# Patient Record
Sex: FEMALE | Race: ASIAN | Employment: FULL TIME | URBAN - METROPOLITAN AREA
[De-identification: names, ages, dates, MRNs, and addresses within clinical notes are randomized per-mention and may not be internally consistent; named-entity substitution may affect disease eponyms.]

---

## 2017-09-22 ENCOUNTER — HOSPITAL ENCOUNTER (EMERGENCY)
Age: 27
Discharge: OTHER INSTITUTION | End: 2017-09-22
Attending: OBSTETRICS & GYNECOLOGY | Admitting: OBSTETRICS & GYNECOLOGY
Payer: COMMERCIAL

## 2017-09-22 VITALS
BODY MASS INDEX: 40.12 KG/M2 | DIASTOLIC BLOOD PRESSURE: 96 MMHG | HEIGHT: 62 IN | WEIGHT: 218 LBS | TEMPERATURE: 98.1 F | SYSTOLIC BLOOD PRESSURE: 131 MMHG | HEART RATE: 94 BPM

## 2017-09-22 PROBLEM — Z33.1 IUP (INTRAUTERINE PREGNANCY), INCIDENTAL: Status: ACTIVE | Noted: 2017-09-22

## 2017-09-22 LAB
APPEARANCE UR: CLEAR
BILIRUB UR QL: NEGATIVE
COLOR UR: YELLOW
GLUCOSE UR QL STRIP.AUTO: NEGATIVE MG/DL
KETONES UR-MCNC: NEGATIVE MG/DL
LEUKOCYTE ESTERASE UR QL STRIP: NEGATIVE
NITRITE UR QL: NEGATIVE
PH UR: 7.5 [PH] (ref 5–9)
PROT UR QL: NEGATIVE MG/DL
RBC # UR STRIP: NEGATIVE /UL
SERVICE CMNT-IMP: NORMAL
SP GR UR: 1.02 (ref 1–1.02)
UROBILINOGEN UR QL: 0.2 EU/DL (ref 0.2–1)

## 2017-09-22 PROCEDURE — 59025 FETAL NON-STRESS TEST: CPT

## 2017-09-22 PROCEDURE — 87081 CULTURE SCREEN ONLY: CPT | Performed by: OBSTETRICS & GYNECOLOGY

## 2017-09-22 PROCEDURE — 96365 THER/PROPH/DIAG IV INF INIT: CPT

## 2017-09-22 PROCEDURE — 74011250636 HC RX REV CODE- 250/636: Performed by: OBSTETRICS & GYNECOLOGY

## 2017-09-22 PROCEDURE — 74011250637 HC RX REV CODE- 250/637: Performed by: OBSTETRICS & GYNECOLOGY

## 2017-09-22 PROCEDURE — 96372 THER/PROPH/DIAG INJ SC/IM: CPT

## 2017-09-22 PROCEDURE — 74011000258 HC RX REV CODE- 258: Performed by: OBSTETRICS & GYNECOLOGY

## 2017-09-22 PROCEDURE — 99283 EMERGENCY DEPT VISIT LOW MDM: CPT

## 2017-09-22 PROCEDURE — 81003 URINALYSIS AUTO W/O SCOPE: CPT

## 2017-09-22 PROCEDURE — 87077 CULTURE AEROBIC IDENTIFY: CPT | Performed by: OBSTETRICS & GYNECOLOGY

## 2017-09-22 PROCEDURE — 96360 HYDRATION IV INFUSION INIT: CPT

## 2017-09-22 RX ORDER — BETAMETHASONE SODIUM PHOSPHATE AND BETAMETHASONE ACETATE 3; 3 MG/ML; MG/ML
12 INJECTION, SUSPENSION INTRA-ARTICULAR; INTRALESIONAL; INTRAMUSCULAR; SOFT TISSUE
Status: COMPLETED | OUTPATIENT
Start: 2017-09-22 | End: 2017-09-22

## 2017-09-22 RX ORDER — AZITHROMYCIN 250 MG/1
1000 TABLET, FILM COATED ORAL
Status: COMPLETED | OUTPATIENT
Start: 2017-09-22 | End: 2017-09-22

## 2017-09-22 RX ORDER — SODIUM CHLORIDE, SODIUM LACTATE, POTASSIUM CHLORIDE, CALCIUM CHLORIDE 600; 310; 30; 20 MG/100ML; MG/100ML; MG/100ML; MG/100ML
125 INJECTION, SOLUTION INTRAVENOUS CONTINUOUS
Status: DISCONTINUED | OUTPATIENT
Start: 2017-09-22 | End: 2017-09-22 | Stop reason: HOSPADM

## 2017-09-22 RX ADMIN — BETAMETHASONE SODIUM PHOSPHATE AND BETAMETHASONE ACETATE 12 MG: 3; 3 INJECTION, SUSPENSION INTRA-ARTICULAR; INTRALESIONAL; INTRAMUSCULAR at 11:01

## 2017-09-22 RX ADMIN — AMPICILLIN SODIUM 2 G: 2 INJECTION, POWDER, FOR SOLUTION INTRAMUSCULAR; INTRAVENOUS at 11:01

## 2017-09-22 RX ADMIN — AZITHROMYCIN 1000 MG: 250 TABLET, FILM COATED ORAL at 11:01

## 2017-09-22 NOTE — PROGRESS NOTES
36  @ 33 weeks arrived from home with c/o LOF. To bathroom to void and change to gown, ambulated to bed and connected to EFM. 1000 Dr. Eliot Lopez paged. 12 Dr. Eliot Lopez at bedside assessing pt.    1138 Pt off unit with transport in stable condition.

## 2017-09-22 NOTE — H&P
History & Physical    Name: Sarai Rodríguez MRN: [id number]  SSN: [id number]    YOB: 1990  Age: 32 y.o. Sex: female      Subjective:     Reason for Admission:  Pregnancy and PPROM    History of Present Illness: Ms. Sylvain Askew is a 32 y.o.  female, [id number] with an estimated gestational age of 28w0d with Estimated Date of Delivery: 11/10/17. Patient complains of leaking fluid overnight after going to the bathroom; she was unsure if she had ruptured her membranes. Then she had a big gush of fluid after rolling over in her bed. She denies any contractions. She also denies any vaginal bleeding. She reports good fetal movement. This pregnancy has been complicated by placenta previa which has resolved. Patient was also being followed by UTD of the right kidney. She also had a normal fetal echo during this pregnancy. POBHx:  x 1 ; EAB x 1     OB History    Para Term  AB Living   2 1 1   1   SAB TAB Ectopic Molar Multiple Live Births        1      # Outcome Date GA Lbr Lukas/2nd Weight Sex Delivery Anes PTL Lv   2 Current            1 Term 11/10/03    Beltran Cartagena        Past Medical History:   Diagnosis Date    Abnormal Papanicolaou smear of cervix 2006    hx of leep     Past Surgical History:   Procedure Laterality Date    HX LEEP PROCEDURE  2006     Social History     Occupational History    Not on file. Social History Main Topics    Smoking status: Never Smoker    Smokeless tobacco: Never Used    Alcohol use No    Drug use: No    Sexual activity: Yes     Partners: Male      History reviewed. No pertinent family history. No Known Allergies  Prior to Admission medications    Medication Sig Start Date End Date Taking? Authorizing Provider   PRENATAL VIT CALC,IRON,FOLIC (PRENATAL #2 PO) Take 1 Tab by mouth daily.    Yes Historical Provider        Review of Systems:  A comprehensive review of systems was negative except for that written in the History of Present Illness. Objective:     Vitals:    Vitals:    17 0950   Weight: 98.9 kg (218 lb)   Height: 5' 2\" (1.575 m)      No data recorded. No Data Recorded     Physical Exam:  Patient without distress. Abdomen: soft, gravid, nontender  Cervical Exam: deferred  Lower Extremities: no calf tenderness  Membranes:  Premature Rupture of Membranes; Amniotic Fluid: clear fluid  Uterine Activity:  None  Fetal Heart Rate:  Reactive; 130's moderate variability, +accels, no decels    Lab/Data Review:  Recent Results (from the past 24 hour(s))   POC URINE MACROSCOPIC    Collection Time: 17  9:51 AM   Result Value Ref Range    Color YELLOW      Appearance CLEAR      Spec. gravity (POC) 1.020 1.001 - 1.023      pH, urine  (POC) 7.5 5.0 - 9.0      Protein (POC) NEGATIVE  NEG mg/dL    Glucose, urine (POC) NEGATIVE  NEG mg/dL    Ketones (POC) NEGATIVE  NEG mg/dL    Bilirubin (POC) NEGATIVE  NEG      Blood (POC) NEGATIVE  NEG      Urobilinogen (POC) 0.2 0.2 - 1.0 EU/dL    Nitrite (POC) NEGATIVE  NEG      Leukocyte esterase (POC) NEGATIVE  NEG      Performed by Irineo Navarro and Plan:      Active Problems:    IUP (intrauterine pregnancy), incidental (2017)       26yo [id number] @ 33w0d with  Premature Rupture of the Membranes  -start zithromax and ampicillin  -steroids for lung maturtiy  -culture obtained for GBS  -discussed case with MFJOSEFINA, Dr. Mccallum who accepts transfer to [facility]  -plan for neonatology consult when pt is at [facility]  -continuous monitor til transferred  -reactive tracing  -discussed plan of care with pt and her family and all questions answered and pt voiced understanding      Signed By:  Severa Hoist, DO     2017

## 2017-09-24 LAB
BACTERIA SPEC CULT: ABNORMAL
SERVICE CMNT-IMP: ABNORMAL

## 2019-08-12 ENCOUNTER — APPOINTMENT (OUTPATIENT)
Dept: PHYSICAL THERAPY | Age: 29
End: 2019-08-12
Payer: COMMERCIAL

## 2019-08-16 ENCOUNTER — HOSPITAL ENCOUNTER (OUTPATIENT)
Dept: PHYSICAL THERAPY | Age: 29
Discharge: HOME | End: 2019-08-16
Payer: COMMERCIAL

## 2019-08-16 PROCEDURE — 97140 MANUAL THERAPY 1/> REGIONS: CPT

## 2019-08-16 PROCEDURE — 97162 PT EVAL MOD COMPLEX 30 MIN: CPT

## 2019-08-16 PROCEDURE — 97110 THERAPEUTIC EXERCISES: CPT

## 2019-08-16 PROCEDURE — 97530 THERAPEUTIC ACTIVITIES: CPT

## 2019-08-16 NOTE — PROGRESS NOTES
[facility]  [address] [place], [address]  Ph [phone]  Fx [phone]    Plan of Care/ Statement of Necessity for Physical Therapy Services    Patient name: Mary Ware Start of Care: 2019   Referral source: Maria Del Rosario Parker NP : 1990    Medical Diagnosis: left SI strain/piriformis syndrome   Onset Date:2019 pregnancy    Treatment Diagnosis: Sacrococcygeal disorders, not elsewhere classified [M53.3]   Prior Hospitalization: see medical history Provider#: [id number]   Medications: Verified on Patient summary List    Comorbidities: previous SI dysfunction with 2nd pregnancy   Prior Level of Function: full function without pain      The Plan of Care and following information is based on the information from the initial evaluation. Assessment/ key information: 29 YOF 29 weeks pregnant is presenting with 3 months history of progressive left SI and left leg pain/paresthesia. Pain is ranging from 3-10/10 and constant in nature. Patient is reporting difficulty with all stooping, reaching left foot to put on sock/shoe and significant pain with any prolonged activity including sitting, standing, walking. Objective findings include TTP of pelvic symphysis/left SI/left piriformis ,Limited left SLR to 60 deg with pain  (alleviated with manual leg distraction),  limited functional mobility due to pain,postural deficits,  antalgia with gait and limited function as per self reported FOTO score. During her first visit patient was instructed in HEP, pain management techniques and an SI belt for pelvic stabilization. She is a good candidate for skilled PT and reported reduction in pain after the first visit.    Evaluation Complexity History MEDIUM  Complexity : 1-2 comorbidities / personal factors will impact the outcome/ POC ; Examination MEDIUM Complexity : 3 Standardized tests and measures addressing body structure, function, activity limitation and / or participation in recreation  ;Presentation MEDIUM Complexity : Evolving with changing characteristics  ; Clinical Decision Making MEDIUM Complexity : FOTO score of 26-74  Overall Complexity Rating: MEDIUM  Problem List: decrease ROM, decrease ADL/ functional abilitiies, decrease activity tolerance and decrease flexibility/ joint mobility   Treatment Plan may include any combination of the following: Therapeutic exercise, Therapeutic activities, Neuromuscular re-education, Physical agent/modality, Manual therapy and Patient education  Patient / Family readiness to learn indicated by: trying to perform skills and interest  Persons(s) to be included in education: patient (P)  Barriers to Learning/Limitations: None  Measures taken if barriers to learning: n/a  Patient Goal (s): control my pain  Patient Self Reported Health Status: good  Rehabilitation Potential: good    Short Term Goals: To be accomplished in 2 weeks:   1. Patient is compliant with HEP and daily use of SI belt for pelvic stability and pain control  Status at [facility]: patient education, patient was given SI belt    2. Reduction in left leg pain by >or= 50% for increased ease with ADL  Status at Eval: intermittent left leg pain /paresthesia     Long Term Goals: To be accomplished in 4 weeks:   1. Improved FOTO score to >or= 64/100 points as evidence of improved function  Status at [facility]: FOTO 36/100 indicating 'extreme difficulty performing usual work or household activities'    2. Reduction in pain to <or= 3/10 with ADL including standing and walking for increased ease with all function  Status at [facility]: pain 3-10/10    3. Improved left piriformis flexibility and increased left SLR to >or=80 deg to help with increased ease putting on shoes/socks on left side  Status at Eval: marked TTP and tightness left piriformis, SLR (+) for pain and limited to 60 deg likely due to muscular tightness      Frequency / Duration: Patient to be seen 1 times per week for 4 weeks.     Patient/ Caregiver education and instruction: Diagnosis, prognosis, activity modification, exercises and other daily use of SI belt   [x]  Plan of care has been reviewed with PTA    Certification Period: n/a  Sam Mcnulty, PT 8/16/2019 12:38 PM    ________________________________________________________________________    I certify that the above Therapy Services are being furnished while the patient is under my care. I agree with the treatment plan and certify that this therapy is necessary.     Physician's Signature:_____________________Date:____________TIME:________    [employer], Date and Time must be completed for valid certification **  Please sign and return to [facility] [address]  [facility], [address]  Ph [phone]  Fx [phone]

## 2019-08-16 NOTE — PROGRESS NOTES
PT DAILY TREATMENT NOTE/LUMBAR EVAL 10-18    Patient Name: Maurilio Kaye  Date:2019  : 1990  [x]  Patient  Verified  Payor: Lucinda Flies / Plan: [employer] HMO / Product Type: HMO /    In time:1115  Out time:1210  Total Treatment Time (min): 55  Visit #: 1 of 8    Medicare/BCBS Only   Total Timed Codes (min):  40 1:1 Treatment Time:  55     Treatment Area: Sacrococcygeal disorders, not elsewhere classified [M53.3]  SUBJECTIVE  Pain Level (0-10 scale): 6/10 left  []constant []intermittent []improving []worsening []no change since onset    Any medication changes, allergies to medications, adverse drug reactions, diagnosis change, or new procedure performed?: [x] No    [] Yes (see summary sheet for update)  Subjective functional status/changes:constant left buttock/sacral pain with intermittent paresthesia into left leg associated with pain. Pain ranging from 3-10/10 john over last 3 months. Pregnant 7 months, due date 10/23/19.   3rd pregnancy. Has had milder versions with last pregnancy. Onset in 2019. Pain is triggered by bending, squatting, standing, walking , sitting . Nocturnal pain is disturbing sleep. 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 Increased pain at end of work day -9/10. Has cut back and is doing Mo-Wed-Fr between 8-12 hours. Difficulty climbing stairs. Difficulty lifting left leg. Difficulty putting on shoes/socks.      PLOF: no back or leg pain  Limitations to PLOF: limited ADL, squatting, prolonged postures  Mechanism of Injury: progressive pregnancy  Current symptoms/Complaints: left SI/LB and left leg pain  Previous Treatment/Compliance: no previous PT  PMHx/Surgical Hx: n/a  Work Hx: as above  Living Situation: 2 level home, stairs  Pt Goals: decrease pain  Barriers: []pain []financial []time []transportation []other  Motivation: good  Substance use: []Alcohol []Tobacco []other:   FABQ Score: []low []elevate  Cognition: A & O x 3 Other:     OBJECTIVE/EXAMINATION  Domestic Life: WNL  Activity/Recreational Limitations: no formal ex program  Mobility: limited  Self Care: difficulty putting on socks/left shoe, independent        Modality rationale: decrease pain and increase tissue extensibility to improve the patients ability to return to PLOF   Min Type Additional Details    [] Estim:  []Unatt       []IFC  []Premod                        []Other:  []w/ice   []w/heat  Position:  Location:    [] Estim: []Att    []TENS instruct  []NMES                    []Other:  []w/US   []w/ice   []w/heat  Position:  Location:    []  Traction: [] Cervical       []Lumbar                       [] Prone          []Supine                       []Intermittent   []Continuous Lbs:  [] before manual  [] after manual    []  Ultrasound: []Continuous   [] Pulsed                           []1MHz   []3MHz Location:  W/cm2:    []  Iontophoresis with dexamethasone         Location: [] Take home patch   [] In clinic   10 []  Ice     [x]  heat  []  Ice massage  []  Laser   []  Anodyne Position:right SL with left leg on bolster  Location:left buttock region    []  Laser with stim  []  Other: Position:  Location:    []  Vasopneumatic Device Pressure:       [] lo [] med [] hi   Temperature: [] lo [] med [] hi   [] Skin assessment post-treatment:  []intact []redness- no adverse reaction    []redness  adverse reaction:     15 min [x]Eval                  []Re-Eval       15 min Therapeutic Exercise:  [x] See flow sheet :   Rationale: increase ROM and increase strength to improve the patients ability to perform ADL with more ease    10 min Therapeutic Activity:  [x]  See flow sheet :instruction in HEP, POC, use of SI belt for better pelvic stability/ 5 min of TA instruction given while patient resting with MH   Rationale: patient education and improved pelvic stability  to improve the patients ability to perform ADL          10 min Manual Therapy:  Gentle STM left piriformis, stretching, left leg manual distraction for pain control   Rationale: decrease pain to allow improved function with ADL              With   [] TE   [x] TA   [] neuro   [] other: Patient Education: [x] Review HEP    [] Progressed/Changed HEP based on:   [] positioning   [] body mechanics   [] transfers   [] heat/ice application    [] other:      Other Objective/Functional Measures: as per eval    Physical Therapy Evaluation - Lumbar Spine ([facility])    SUBJECTIVE  Chief Complaint:left buttock/SI and left leg pain    Mechanism of injury:pregnancy    Symptoms:  Aggravated by:   [x] Bending [x] Sitting [x] Standing [x] Walking   [x] Moving [] Cough [] Sneeze [] Valsalva   [] AM  [x] PM  Lying:  [] sup   [] pro   [] sidelying   [] Other:     Eased by:    [] Bending [] Sitting [] Standing [] Walking   [] Moving [] AM  [] PM  Lying: [] sup  [] pro  [] sidelying   [x] Other:rest     General Health:  Red Flags Indicated? [] Yes    [x] No  [] Yes [] No Recent weight change (If yes, due to dieting?  [] Yes  [] No)   [] Yes [] No Weakness in legs during walking  [] Yes [] No Unremitting pain at night  [] Yes [] No Abdominal pain or problems  [] Yes [] No Rectal bleeding  [] Yes [] No Feet more cold or painful in cold weather  [] Yes [] No Menstrual irregularities  [] Yes [] No Blood or pain with urination  [] Yes [] No Dysfunction of bowel or bladder  [] Yes [] No Recent illness within past 3 weeks (i.e, cold, flu)  [] Yes [] No Numbness/tingling in buttock/genitalia region    Past History/Treatments: n/a    Diagnostic Tests: [] Lab work [] X-rays    [] CT [] MRI     [] Other:  Results:    Functional Status  Prior level of function:full function, no pain  Present functional limitations:limited ADL tolerances due to pain and instability  What position do you sleep in?:supine, SL    OBJECTIVE  Posture:  Lateral Shift: [] R    [] L     [] +  [x] -  Kyphosis: [] Increased [] Decreased   [x]  WNL  Lordosis:  [x] Increased [] Decreased   [] WNL  Pelvic symmetry: [x] WNL    [] Other:    Gait:  [] Normal     [x] Abnormal:slowed gait with increased LS lordosis and mild antalgia due to left hip pain with WB    Active Movements: [] N/A   [] Too acute   [x] Other:limited in all planes due to left SI and leg pain  ROM % AROM % PROM Comments:pain, area   Forward flexion 40-60      Extension 20-30      SB right 20-30      SB left 20-30      Rotation right 5-10      Rotation left 5-10            Neuro Screen [] WNL  Myotome/Dermatome/Reflexes:  Comments:intermittent numbness left leg into foot    Dural Mobility:  SLR Sitting: [] R    [x] L    [x] +    [] -  @ (60 degrees): 60          Supine: [] R    [x] L    [x] +    [] -  @ (degrees):alleviation of pain with manual distraction at end range   Slump Test: [] R    [x] L    [x] +    [] -  @ (degrees):   Prone Knee Bend: [] R    [] L    [] +    [] -     Palpation  [] Min  [x] Mod  [] Severe    Location:left SI/sacral sulcus, PSIS and piriformis m, pelvic symphysis  [] Min  [] Mod  [] Severe    Location:  [] Min  [] Mod  [] Severe    Location:    Strength: functional 5/5 strength   L(0-5) R (0-5) N/T   Hip Flexion (L1,2)   []   Knee Extension (L3,4)   []   Ankle Dorsiflexion (L4)   []   Great Toe Extension (L5)   []   Ankle Plantarflexion (S1)   []   Knee Flexion (S1,2)   []   Upper Abdominals   []   Lower Abdominals   []   Paraspinals   []   Back Rotators   []   Gluteus Fady   []   Other   []     Special Tests  Lumbar:  Lumb.  Compression: [] Pos  [] Neg               Lumbar Distraction:   [] Pos  [] Neg    Quadrant:  [] Pos  [] Neg   [] Flex  [] Ext    Sacroilliac:  Gaenslen's: [] R    [] L    [] +    [] -     Compression: [] +    [] -     Gapping:  [] +    [] -     Thigh Thrust: [] R    [] L    [] +    [] -     Leg Length: [] +    [] -   Position:    Crests:    ASIS:    PSIS:+ left    Sacral Sulcus:+ left    Mobility: Standing flex:     Sitting flex:     Supine to sit:     Prone knee bend: Hip: Patience Hanahan:  [] R    [] L    [] +    [] -     Scour:  [] R    [] L    [] +    [] -     Piriformis: [] R    [x] L    [x] +    [] -          Deficits: Alvarado's: [] R    [] L    [] +    [] -     Yuko Public: [] R    [] L    [] +    [] -     Hamstrings 90/90:left 60, right 90    Gastrocsoleus (to neutral): Right: Left:       Global Muscular Weakness:  Abdominals:deficit due to advanced pregnancy  Quadratus Lumborum:  Paraspinals:tight  Other:    Other tests/comments:       Pain Level (0-10 scale) post treatment: 5/10    ASSESSMENT/Changes in Function: excellent response to MT, stretches and trial of SI belt. During session pain reduction to 4/10. Patient will continue to benefit from skilled PT services to address strength deficits and analyze and address soft tissue restrictions to attain remaining goals.      [x]  See Plan of Care  []  See progress note/recertification  []  See Discharge Summary         Progress towards goals / Updated goals:  Reduction in pain, good understanding of HEP and pain management techniques  Also good understanding of SI belt for improved pelvic stability and pain control    PLAN  []  Upgrade activities as tolerated     [x]  Continue plan of care  []  Update interventions per flow sheet       []  Discharge due to:_  []  Other:_      Deisi Butcher, PT 8/16/2019  11:19 AM

## 2019-08-20 ENCOUNTER — HOSPITAL ENCOUNTER (OUTPATIENT)
Dept: PHYSICAL THERAPY | Age: 29
Discharge: HOME | End: 2019-08-20
Payer: COMMERCIAL

## 2019-08-20 PROCEDURE — 97110 THERAPEUTIC EXERCISES: CPT

## 2019-08-20 PROCEDURE — 97140 MANUAL THERAPY 1/> REGIONS: CPT

## 2019-08-20 NOTE — PROGRESS NOTES
PT DAILY TREATMENT NOTE    Patient Name: Anmol Soliz  Date:2019  : 1990  [x]  Patient  Verified  Payor: Sridhar Richard / Plan: [employer] HMO / Product Type: HMO /    In time:352  Out time:443  Total Treatment Time (min): 51  Total Timed Codes (min): 41  1:1 Treatment Time (MC only): 41   Visit #: 2 of 8    Treatment Area: Sacrococcygeal disorders, not elsewhere classified [M53.3]    SUBJECTIVE  Pain Level (0-10 scale): 6/10  Any medication changes, allergies to medications, adverse drug reactions, diagnosis change, or new procedure performed?: [x] No    [] Yes (see summary sheet for update)  Subjective functional status/changes:   [] No changes reported  Pt reports that her pain vacillates up and down.     OBJECTIVE    Modalities Rationale:     decrease pain and increase tissue extensibility to improve patient's ability to stand more than 30 minutes   min [] Estim, type/location:                                      []  att     []  unatt     []  w/US     []  w/ice    []  w/heat    min []  Mechanical Traction: type/lbs                   []  pro   []  sup   []  int   []  cont    []  before manual    []  after manual    min []  Ultrasound, settings/location:      min []  Iontophoresis w/ dexamethasone, location:                                               []  take home patch       []  in clinic   10 min []  Ice     [x]  Heat    location/position: Legs up low back    min []  Vasopneumatic Device, press/temp:     min []  Other:    [x] Skin assessment post-treatment (if applicable):    [x]  intact    [x]  redness- no adverse reaction     []redness  adverse reaction:          18 min Therapeutic Exercise:  [] See flow sheet :   Rationale: increase ROM, increase strength, improve coordination and increase proprioception to improve the patients ability to stand more than 30 minutes      23 min Manual Therapy:  PROM bilateral hips, myofascial compression bilaterally into hips, myofascial leg pull on left to correct left posterior innominate rotation   Rationale: decrease pain, increase ROM, increase tissue extensibility, decrease edema  and decrease trigger points to improve the patients ability to stand more than 30 minutes    With   [x] TE   [] TA   [] neuro   [] other: Patient Education: [x] Review HEP    [] Progressed/Changed HEP based on:   [x] positioning   [x] body mechanics   [] transfers   [x] heat/ice application    [] other:      Other Objective/Functional Measures:      Pain Level (0-10 scale) post treatment: right 5/10 and left 3-4/10    ASSESSMENT/Changes in Function: Pt demonstrates left posterior innominate rotation that was corrected with manual work. She also demonstrates signficiant myofascial tension through bilateral hips right more than left despite symptomatic in left hip    Patient will continue to benefit from skilled PT services to address functional mobility deficits, address ROM deficits, address strength deficits, analyze and address soft tissue restrictions, analyze and cue movement patterns, analyze and modify body mechanics/ergonomics and assess and modify postural abnormalities to attain remaining goals. []  See Plan of Care  []  See progress note/recertification  []  See Discharge Summary         Progress towards goals / Updated goals:  Short Term Goals: To be accomplished in 2 weeks:               1. Patient is compliant with HEP and daily use of SI belt for pelvic stability and pain control  Status at Eval: patient education, patient was given SI belt  Current: [employer]     2. Reduction in left leg pain by >or= 50% for increased ease with ADL  Status at Eval: intermittent left leg pain /paresthesia   Current: Intemittent improvement     Long Term Goals:  To be accomplished in 4 weeks:               1. Improved FOTO score to >or= 64/100 points as evidence of improved function  Status at Eval: FOTO 36/100 indicating 'extreme difficulty performing usual work or household activities'     2. Reduction in pain to <or= 3/10 with ADL including standing and walking for increased ease with all function  Status at Eval: pain 3-10/10     3.  Improved left piriformis flexibility and increased left SLR to >or=80 deg to help with increased ease putting on shoes/socks on left side  Status at Eval: marked TTP and tightness left piriformis, SLR (+) for pain and limited to 60 deg likely due to muscular tightness    PLAN  []  Upgrade activities as tolerated     []  Continue plan of care  []  Update interventions per flow sheet       []  Discharge due to:_  []  Other:_      Del Salter PTA 8/20/2019  2:15 PM    Future Appointments   Date Time Provider Davey Kirby   8/20/2019  3:45 PM Iram Baig [facility]   8/27/2019  3:45 PM Lauri Cleaning PTA [facility]   9/3/2019  2:30 PM Shantell Simmons PT [facility]   9/10/2019  3:45 PM Lauri Cleaning PTA [facility]

## 2019-08-27 ENCOUNTER — HOSPITAL ENCOUNTER (OUTPATIENT)
Dept: PHYSICAL THERAPY | Age: 29
Discharge: HOME | End: 2019-08-27
Payer: COMMERCIAL

## 2019-08-27 PROCEDURE — 97110 THERAPEUTIC EXERCISES: CPT

## 2019-08-27 PROCEDURE — 97140 MANUAL THERAPY 1/> REGIONS: CPT

## 2019-08-27 NOTE — PROGRESS NOTES
PT DAILY TREATMENT NOTE    Patient Name: Willian Guerra  Date:2019  : 1990  [x]  Patient  Verified  Payor: Jacey Hendricks / Plan: [employer] HMO / Product Type: HMO /    In time:349  Out time:455  Total Treatment Time (min): 66  Total Timed Codes (min): 56  1:1 Treatment Time ([address] only): 64   Visit #: 3 of 8    Treatment Area: Pain of left sacroiliac joint [M53.3]  Pain of right sacroiliac joint [M53.3]    SUBJECTIVE  Pain Level (0-10 scale): 9/10  Any medication changes, allergies to medications, adverse drug reactions, diagnosis change, or new procedure performed?: [x] No    [] Yes (see summary sheet for update)  Subjective functional status/changes:   [] No changes reported  Pt reports that she had a flare of pain since the weekend. She thinks she may have overdone it.     OBJECTIVE    Modalities Rationale:     decrease pain and increase tissue extensibility to improve patient's ability to complete work day   min [] Estim, type/location:                                      []  att     []  unatt     []  w/US     []  w/ice    []  w/heat    min []  Mechanical Traction: type/lbs                   []  pro   []  sup   []  int   []  cont    []  before manual    []  after manual    min []  Ultrasound, settings/location:      min []  Iontophoresis w/ dexamethasone, location:                                               []  take home patch       []  in clinic   10 min []  Ice     [x]  Heat    location/position: Low back, long sittting with wedge    min []  Vasopneumatic Device, press/temp:     min []  Other:    [x] Skin assessment post-treatment (if applicable):    [x]  intact    [x]  redness- no adverse reaction     []redness  adverse reaction:          26 min Therapeutic Exercise:  [] See flow sheet :   Rationale: increase ROM, increase strength, improve coordination and increase proprioception to improve the patients ability to complete work day      30 min Manual Therapy:  DTM to left QL, myofascial leg pull on left, myofascial arm pull on left   Rationale: decrease pain, increase ROM, increase tissue extensibility, decrease edema  and decrease trigger points to improve the patients ability to complete work day    With   [x] TE   [] TA   [] neuro   [] other: Patient Education: [x] Review HEP    [] Progressed/Changed HEP based on:   [x] positioning   [x] body mechanics   [] transfers   [] heat/ice application    [] other:      Other Objective/Functional Measures:      Pain Level (0-10 scale) post treatment: 6/10    ASSESSMENT/Changes in Function: Pt demonstrates mild left posterior innominate rotation contribuitng to increased pain levels. Pt very limited through left QL contributing to symptoms as well. Pt reported reduced pain post treatment. Patient will continue to benefit from skilled PT services to address functional mobility deficits, address ROM deficits, address strength deficits, analyze and address soft tissue restrictions, analyze and cue movement patterns, analyze and modify body mechanics/ergonomics and assess and modify postural abnormalities to attain remaining goals. []  See Plan of Care  []  See progress note/recertification  []  See Discharge Summary         Progress towards goals / Updated goals:  Short Term Goals: To be accomplished in 2 weeks:               1. Patient is compliant with HEP and daily use of SI belt for pelvic stability and pain control  Status at Eval: patient education, patient was given SI belt  Current: [employer]     2. Reduction in left leg pain by >or= 50% for increased ease with ADL  Status at Eval: intermittent left leg pain /paresthesia   Current: Intemittent improvement     Long Term Goals: To be accomplished in 4 weeks:               1.  Improved FOTO score to >or= 64/100 points as evidence of improved function  Status at Eval: FOTO 36/100 indicating 'extreme difficulty performing usual work or household activities'     2. Reduction in pain to <or= 3/10 with ADL including standing and walking for increased ease with all function  Status at Eval: pain 3-10/10     3.  Improved left piriformis flexibility and increased left SLR to >or=80 deg to help with increased ease putting on shoes/socks on left side  Status at Eval: marked TTP and tightness left piriformis, SLR (+) for pain and limited to 60 deg likely due to muscular tightness    PLAN  []  Upgrade activities as tolerated     []  Continue plan of care  []  Update interventions per flow sheet       []  Discharge due to:_  []  Other:_      Sonny Andrade PTA 8/27/2019  4:30 PM    Future Appointments   Date Time Provider Davey Kirby   9/3/2019  2:30 PM Cara West PT [facility]   9/10/2019  3:45 PM Vito Horton PTA [facility]

## 2019-09-03 ENCOUNTER — HOSPITAL ENCOUNTER (OUTPATIENT)
Dept: PHYSICAL THERAPY | Age: 29
Discharge: HOME | End: 2019-09-03
Payer: COMMERCIAL

## 2019-09-03 PROCEDURE — 97110 THERAPEUTIC EXERCISES: CPT

## 2019-09-03 NOTE — PROGRESS NOTES
PT DAILY TREATMENT NOTE    Patient Name: Neema Lee  Date:9/3/2019  : 1990  [x]  Patient  Verified  Payor: Prashant Mejía / Plan: [employer] / Product Type: HMO /    In time:2:00  Out time:2:50  Total Treatment Time (min): 50  Total Timed Codes (min): 50  1:1 Treatment Time ([facility] only): NA   Visit #: 4 of 8    Treatment Area: Pain of left sacroiliac joint [M53.3]  Pain of right sacroiliac joint [M53.3]    SUBJECTIVE  Pain Level (0-10 scale): 6/10  Any medication changes, allergies to medications, adverse drug reactions, diagnosis change, or new procedure performed?: [x] No    [] Yes (see summary sheet for update)  Subjective functional status/changes:   [] No changes reported  \"I have pain in my lower left leg and down my left leg. \"    OBJECTIVE    Modalities Rationale:     decrease edema, decrease inflammation and decrease pain to improve patient's ability to return to prior level of physical activity. 10 min []  Ice     [x]  Heat    location/position: Supine/low back   [] Skin assessment post-treatment (if applicable):    [x]  intact    []  redness- no adverse reaction     []redness  adverse reaction:          40 min Therapeutic Exercise:  [x] See flow sheet :   Rationale: increase ROM, increase strength and improve coordination to improve the patients ability to return to prior level of physical activity. With   [] TE   [] TA   [] neuro   [] other: Patient Education: [x] Review HEP    [] Progressed/Changed HEP based on:   [] positioning   [] body mechanics   [] transfers   [] heat/ice application    [] other:      Other Objective/Functional Measures:      Pain Level (0-10 scale) post treatment: 3/10    ASSESSMENT/Changes in Function: Pt tolerated session moderately well with no increased pain. Pt did report continued low back pain post tx but positive reduction of radicular pain.      Patient will continue to benefit from skilled PT services to modify and progress therapeutic interventions, address functional mobility deficits, address ROM deficits, address strength deficits, analyze and address soft tissue restrictions, analyze and cue movement patterns, analyze and modify body mechanics/ergonomics and assess and modify postural abnormalities to attain remaining goals. []  See Plan of Care  []  See progress note/recertification  []  See Discharge Summary         Progress towards goals / Updated goals:  Short Term Goals: To be accomplished in 2 weeks:               1. Patient is compliant with HEP and daily use of SI belt for pelvic stability and pain control  Status at Eval: patient education, patient was given SI belt  Current: [employer]     2. Reduction in left leg pain by >or= 50% for increased ease with ADL  Status at Eval: intermittent left leg pain /paresthesia   Current: Intemittent improvement     Long Term Goals: To be accomplished in 4 weeks:               1. Improved FOTO score to >or= 64/100 points as evidence of improved function  Status at Eval: FOTO 36/100 indicating 'extreme difficulty performing usual work or household activities'     2. Reduction in pain to <or= 3/10 with ADL including standing and walking for increased ease with all function  Status at Eval: pain 3-10/10  Current: Pt reports 9/10 at max in past week 9/3/19     3.  Improved left piriformis flexibility and increased left SLR to >or=80 deg to help with increased ease putting on shoes/socks on left side  Status at Eval: marked TTP and tightness left piriformis, SLR (+) for pain and limited to 60 deg likely due to muscular tightness    PLAN  [x]  Upgrade activities as tolerated     [x]  Continue plan of care  []  Update interventions per flow sheet       []  Discharge due to:_  []  Other:_      Amy Nunez PTA 9/3/2019  5:18 PM    Future Appointments   Date Time Provider Davey Kirby   9/10/2019  3:45 PM Rylan Wesley PTA [facility]

## 2019-09-12 ENCOUNTER — HOSPITAL ENCOUNTER (OUTPATIENT)
Dept: PHYSICAL THERAPY | Age: 29
Discharge: HOME | End: 2019-09-12
Payer: COMMERCIAL

## 2019-09-12 PROCEDURE — 97110 THERAPEUTIC EXERCISES: CPT

## 2019-09-12 NOTE — PROGRESS NOTES
PT DAILY TREATMENT NOTE    Patient Name: Tashia Mckee  Date:2019  : 1990  [x]  Patient  Verified  Payor: Deepak Graham / Plan: [employer] / Product Type: HMO /    In time:1100: Out time:11:59  Total Treatment Time (min): 59  Total Timed Codes (min): 59  1:1 Treatment Time ([facility] only): NA   Visit #: 5 of 8    Treatment Area: Pain of left sacroiliac joint [M53.3]  Pain of right sacroiliac joint [M53.3]    SUBJECTIVE  Pain Level (0-10 scale): 5/10  Any medication changes, allergies to medications, adverse drug reactions, diagnosis change, or new procedure performed?: [x] No    [] Yes (see summary sheet for update)  Subjective functional status/changes:   [] No changes reported  \"I have pain on the other side today. It's going into my butt a little as well. \"    OBJECTIVE    Modalities Rationale:     decrease edema, decrease inflammation and decrease pain to improve patient's ability to return to prior level of physical activity. 10 min []  Ice     [x]  Heat    location/position: Supine/Left Low back   [x] Skin assessment post-treatment (if applicable):    [x]  intact    []  redness- no adverse reaction     []redness  adverse reaction:        49 min Therapeutic Exercise:  [x] See flow sheet :   Rationale: increase ROM, increase strength and improve coordination to improve the patients ability to return to prior level of physical activity. With   [] TE   [] TA   [] neuro   [] other: Patient Education: [x] Review HEP    [] Progressed/Changed HEP based on:   [] positioning   [] body mechanics   [] transfers   [] heat/ice application    [] other:      Other Objective/Functional Measures:      Pain Level (0-10 scale) post treatment: 6/10    ASSESSMENT/Changes in Function: Pt tolerated session well. Pt initially reported no pain but as pt progressed through tx, pt reported increased pain. Pt received MHP post tx.      Patient will continue to benefit from skilled PT services to modify and progress therapeutic interventions, address functional mobility deficits, address ROM deficits, address strength deficits, analyze and address soft tissue restrictions, analyze and cue movement patterns, analyze and modify body mechanics/ergonomics and assess and modify postural abnormalities to attain remaining goals. []  See Plan of Care  []  See progress note/recertification  []  See Discharge Summary         Progress towards goals / Updated goals:  Short Term Goals: To be accomplished in 2 weeks:               1. Patient is compliant with HEP and daily use of SI belt for pelvic stability and pain control  Status at Eval: patient education, patient was given SI belt  Current: Pt reprotes 2-3x/week compliance 9/12/19     2. Reduction in left leg pain by >or= 50% for increased ease with ADL  Status at Eval: intermittent left leg pain /paresthesia   Current: Intemittent improvement     Long Term Goals: To be accomplished in 4 weeks:               1. Improved FOTO score to >or= 64/100 points as evidence of improved function  Status at Eval: FOTO 36/100 indicating 'extreme difficulty performing usual work or household activities'     2. Reduction in pain to <or= 3/10 with ADL including standing and walking for increased ease with all function  Status at Eval: pain 3-10/10  Current: Pt reports 9/10 at max in past week 9/3/19     3.  Improved left piriformis flexibility and increased left SLR to >or=80 deg to help with increased ease putting on shoes/socks on left side  Status at Eval: marked TTP and tightness left piriformis, SLR (+) for pain and limited to 60 deg likely due to muscular tightness    PLAN  [x]  Upgrade activities as tolerated     [x]  Continue plan of care  []  Update interventions per flow sheet       []  Discharge due to:_  []  Other:_      Adan Mejia, AMRITA 9/12/2019  1:37 PM    Future Appointments   Date Time Provider Davey Kirby   9/17/2019  5:00 PM Kiran Rutledge, PT [facility]

## 2019-09-17 ENCOUNTER — HOSPITAL ENCOUNTER (OUTPATIENT)
Dept: PHYSICAL THERAPY | Age: 29
Discharge: HOME | End: 2019-09-17
Payer: COMMERCIAL

## 2019-09-17 PROCEDURE — 97110 THERAPEUTIC EXERCISES: CPT

## 2019-09-17 PROCEDURE — 97140 MANUAL THERAPY 1/> REGIONS: CPT

## 2019-09-17 NOTE — PROGRESS NOTES
[facility] at [address] [place], [address] [place]  Ph [phone]  Fx [phone]    Physical Therapy Progress Note  Patient name: Lindsey Rdz Start of Care: 2019   Referral source: Domo Villegas NP : 1990                Medical Diagnosis: left SI strain/piriformis syndrome    Onset Date:2019 pregnancy                Treatment Diagnosis: Sacrococcygeal disorders, not elsewhere classified [M53.3]   Prior Hospitalization: see medical history Provider#: [id number]   Medications: Verified on Patient summary List    Comorbidities: previous SI dysfunction with 2nd pregnancy   Prior Level of Function: full function without pain      Visits from Start of Care: 6    Missed Visits: 0    Goals/Measure of Progress:Mrs. Geraldine Gupta has been showing progress in function and is responding well to therapy. Pain levels continue to fluctuate. Progress towards goals / Updated goals:  Short Term Goals: To be accomplished in 2 weeks:               1. Patient is compliant with HEP and daily use of SI belt for pelvic stability and pain control  Status at [facility]: patient education, patient was given SI belt  Current: Pt reprotes 2-3x/week compliance 19  Status on 19: compliant with HEP     2. Reduction in left leg pain by >or= 50% for increased ease with ADL  Status at [facility]: intermittent left leg pain /paresthesia   Current status 19: reduction of leg pain by 80%, goal met     Long Term Goals: To be accomplished in 4 weeks:               1. Improved FOTO score to >or= 64/100 points as evidence of improved function  Status at [facility]: FOTO 36/100 indicating 'extreme difficulty performing usual work or household activities'  Status on 19: 57/100, progressing     2.  Reduction in pain to <or= 3/10 with ADL including standing and walking for increased ease with all function  Status at [facility]: pain 3-10/10  Current: Pt reports 9/10 at max in past week 9/3/19  Status on 19: pain fluctuating up to 9/10, at times difficulty getting out of bed, left side significantly improved but currently increased in right SI pain, progressing     3.  Improved left piriformis flexibility and increased left SLR to >or=80 deg to help with increased ease putting on shoes/socks on left side  Status at Eval: marked TTP and tightness left piriformis, SLR (+) for pain and limited to 60 deg likely due to muscular tightness  Status on 9/17/19: SLR improved to 90 both, goal met        ASSESSMENT/RECOMMENDATIONS:  [x]Continue therapy as per protocol at a frequency of  2 x per week for 3 weeks (additional 6 visits)  []Continue therapy with the following recommended changes:_____________________ _____________________________ ________________________________________  []Discontinue therapy progressing towards or have reached established goals  []Discontinue therapy due to lack of appreciable progress towards goals  []Discontinue therapy due to lack of attendance or compliance  []Await Physician's recommendations/decisions regarding therapy  []Other:________________________________________________________________    Thank you for this referral.   Estrella Saucedo, PT 9/17/2019 5:41 PM    NOTE TO PHYSICIAN:  [address]   FAX TO [facility]: [phone]  If you are unable to process this request in 24 hours please contact our office: [phone]      ____ I have read the above report and request that my patient continue therapy with the following changes/special instructions:  ____ I have read the above report and request that my patient be discharged from therapy    Physician's Signature:____________Date:_________TIME:________    ** Signature, Date and Time must be completed for valid certification **

## 2019-09-17 NOTE — PROGRESS NOTES
PT DAILY TREATMENT NOTE    Patient Name: Selina Doing  Date:2019  : 1990  [x]  Patient  Verified  Payor: Jeanne Heller / Plan: [address] [place] [address] / Product Type: PPO /    In time:456  Out time:535  Total Treatment Time (min): 39  Total Timed Codes (min): 39  1:1 Treatment Time ([facility] only): NA   Visit #: 6 of 8    Treatment Area: Pain of left sacroiliac joint [M53.3]  Pain of right sacroiliac joint [M53.3]    SUBJECTIVE  Pain Level (0-10 scale): 10  Any medication changes, allergies to medications, adverse drug reactions, diagnosis change, or new procedure performed?: [x] No    [] Yes (see summary sheet for update)  Subjective functional status/changes:   [] No changes reported  \" The pain has shifted to my right buttock. \"    OBJECTIVE    Modalities Rationale:     decrease edema, decrease inflammation and decrease pain to improve patient's ability to return to prior level of physical activity. 10 min []  Ice     [x]  Heat    location/position: Supine/Left Low back   [x] Skin assessment post-treatment (if applicable):    [x]  intact    []  redness- no adverse reaction     []redness  adverse reaction:        29 min Therapeutic Exercise:  [x] See flow sheet :reevaluation   Rationale: increase ROM, increase strength and improve coordination to improve the patients ability to return to prior level of physical activity. 10 min Manual Therapy: functional massage right piriformis, gentle mobs right SI/posterior rotation of right ilium  Rationale: improved pelvic alignment and functional mobility     With   [] TE   [] TA   [] neuro   [] other: Patient Education: [x] Review HEP    [] Progressed/Changed HEP based on:   [] positioning   [] body mechanics   [] transfers   [] heat/ice application    [] other:      Other Objective/Functional Measures: TTP right SIJ     Pain Level (0-10 scale) post treatment: 3/10    ASSESSMENT/Changes in Function: most pain in right SIJ.  Reduction in symptoms post treatment. Patient will continue to benefit from skilled PT services to modify and progress therapeutic interventions, address functional mobility deficits, address ROM deficits, address strength deficits, analyze and address soft tissue restrictions, analyze and cue movement patterns, analyze and modify body mechanics/ergonomics and assess and modify postural abnormalities to attain remaining goals. [x]  See Plan of Care  [x]  See progress note/recertification  []  See Discharge Summary         Progress towards goals / Updated goals:  Short Term Goals: To be accomplished in 2 weeks:               1. Patient is compliant with HEP and daily use of SI belt for pelvic stability and pain control  Status at Eval: patient education, patient was given SI belt  Current: Pt reprotes 2-3x/week compliance 9/12/19  Status on 9/17/19: compliant with HEP     2. Reduction in left leg pain by >or= 50% for increased ease with ADL  Status at [facility]: intermittent left leg pain /paresthesia   Current status 9/17/19: reduction of leg pain by 80%, goal met     Long Term Goals: To be accomplished in 4 weeks:               1. Improved FOTO score to >or= 64/100 points as evidence of improved function  Status at [facility]: FOTO 36/100 indicating 'extreme difficulty performing usual work or household activities'  Status on 9/17/19: 57/100, progressing     2. Reduction in pain to <or= 3/10 with ADL including standing and walking for increased ease with all function  Status at [facility]: pain 3-10/10  Current: Pt reports 9/10 at max in past week 9/3/19  Status on 9/17/19: pain fluctuating up to 9/10, at times difficulty getting out of bed, left side significantly improved but currently increased in right SI pain, progressing     3.  Improved left piriformis flexibility and increased left SLR to >or=80 deg to help with increased ease putting on shoes/socks on left side  Status at Eval: marked TTP and tightness left piriformis, SLR (+) for pain and limited to 60 deg likely due to muscular tightness  Status on 9/17/19: SLR improved to 90 both, goal met    PLAN  [x]  Upgrade activities as tolerated     [x]  Continue plan of care for 3 weeks to address residual pain and pelvic instability  []  Update interventions per flow sheet       []  Discharge due to:_  []  Other:_      Thelma Curry, PT 9/17/2019  1:37 PM    No future appointments.

## 2019-10-08 NOTE — PROGRESS NOTES
[facility]  [address] [place], [address]  Ph [phone]  Fx [phone]    Physical Therapy Discharge Summary    Patient Name: Maurilio Kaye : 1990   Treatment/Medical Diagnosis: Pain of left sacroiliac joint [M53.3]  Pain of right sacroiliac joint [M53.3]   Referral Source: Jose L Santo NP     Date of Initial Visit: 19 Attended Visits: 6 Missed Visits: 0       SUMMARY OF TREATMENT  Pt attended only initial evaluation and     5     follow-ups and then did not return. Please refer to progress report from 19 for most recent status. RECOMMENDATIONS  Discontinue physical therapy due to patient not returning. If you have any questions/comments please contact us directly at [phone]. Thank you for allowing us to assist in the care of your patient.     Therapist Signature: Chuyita Harrison, PT Date: 10/8/19     Time: 9:18 AM

## 2020-11-04 ENCOUNTER — TRANSCRIBE ORDER (OUTPATIENT)
Dept: REGISTRATION | Age: 30
End: 2020-11-04

## 2020-11-04 ENCOUNTER — HOSPITAL ENCOUNTER (OUTPATIENT)
Dept: PREADMISSION TESTING | Age: 30
Discharge: HOME | End: 2020-11-04
Payer: COMMERCIAL

## 2020-11-04 DIAGNOSIS — Z01.818 OTHER SPECIFIED PRE-OPERATIVE EXAMINATION: Primary | ICD-10-CM

## 2020-11-04 DIAGNOSIS — Z01.818 OTHER SPECIFIED PRE-OPERATIVE EXAMINATION: ICD-10-CM

## 2020-11-04 LAB
ATRIAL RATE: 62 BPM
BASOPHILS # BLD: 0.1 K/UL (ref 0–0.1)
BASOPHILS NFR BLD: 1 % (ref 0–2)
CALCULATED P AXIS, ECG09: 63 DEGREES
CALCULATED R AXIS, ECG10: 77 DEGREES
CALCULATED T AXIS, ECG11: 68 DEGREES
DIAGNOSIS, 93000: NORMAL
DIFFERENTIAL METHOD BLD: NORMAL
EOSINOPHIL # BLD: 0.4 K/UL (ref 0–0.4)
EOSINOPHIL NFR BLD: 5 % (ref 0–5)
ERYTHROCYTE [DISTWIDTH] IN BLOOD BY AUTOMATED COUNT: 12.8 % (ref 11.6–14.5)
HCT VFR BLD AUTO: 43.3 % (ref 35–45)
HGB BLD-MCNC: 14.2 G/DL (ref 12–16)
LYMPHOCYTES # BLD: 2.7 K/UL (ref 0.9–3.6)
LYMPHOCYTES NFR BLD: 37 % (ref 21–52)
MCH RBC QN AUTO: 30.7 PG (ref 24–34)
MCHC RBC AUTO-ENTMCNC: 32.8 G/DL (ref 31–37)
MCV RBC AUTO: 93.5 FL (ref 74–97)
MONOCYTES # BLD: 0.5 K/UL (ref 0.05–1.2)
MONOCYTES NFR BLD: 7 % (ref 3–10)
NEUTS SEG # BLD: 3.7 K/UL (ref 1.8–8)
NEUTS SEG NFR BLD: 50 % (ref 40–73)
P-R INTERVAL, ECG05: 166 MS
PLATELET # BLD AUTO: 274 K/UL (ref 135–420)
PMV BLD AUTO: 10 FL (ref 9.2–11.8)
POTASSIUM SERPL-SCNC: 4.2 MMOL/L (ref 3.5–5.5)
Q-T INTERVAL, ECG07: 396 MS
QRS DURATION, ECG06: 84 MS
QTC CALCULATION (BEZET), ECG08: 401 MS
RBC # BLD AUTO: 4.63 M/UL (ref 4.2–5.3)
VENTRICULAR RATE, ECG03: 62 BPM
WBC # BLD AUTO: 7.3 K/UL (ref 4.6–13.2)

## 2020-11-04 PROCEDURE — 85025 COMPLETE CBC W/AUTO DIFF WBC: CPT

## 2020-11-04 PROCEDURE — 84132 ASSAY OF SERUM POTASSIUM: CPT

## 2020-11-04 PROCEDURE — 36415 COLL VENOUS BLD VENIPUNCTURE: CPT

## 2020-11-04 PROCEDURE — 93005 ELECTROCARDIOGRAM TRACING: CPT

## 2020-11-09 ENCOUNTER — HOSPITAL ENCOUNTER (OUTPATIENT)
Dept: LAB | Age: 30
Discharge: HOME | End: 2020-11-09
Payer: COMMERCIAL

## 2020-11-09 PROCEDURE — 88305 TISSUE EXAM BY PATHOLOGIST: CPT
